# Patient Record
Sex: MALE | Race: BLACK OR AFRICAN AMERICAN | Employment: FULL TIME | URBAN - METROPOLITAN AREA
[De-identification: names, ages, dates, MRNs, and addresses within clinical notes are randomized per-mention and may not be internally consistent; named-entity substitution may affect disease eponyms.]

---

## 2018-07-05 ENCOUNTER — HOSPITAL ENCOUNTER (INPATIENT)
Facility: HOSPITAL | Age: 48
LOS: 1 days | Discharge: HOME | DRG: 305 | End: 2018-07-06
Attending: EMERGENCY MEDICINE | Admitting: HOSPITALIST
Payer: COMMERCIAL

## 2018-07-05 ENCOUNTER — APPOINTMENT (OUTPATIENT)
Dept: GENERAL RADIOLOGY | Facility: HOSPITAL | Age: 48
DRG: 305 | End: 2018-07-05
Attending: EMERGENCY MEDICINE
Payer: COMMERCIAL

## 2018-07-05 DIAGNOSIS — R07.9 ACUTE CHEST PAIN: Primary | ICD-10-CM

## 2018-07-05 LAB
ALBUMIN SERPL-MCNC: 3.3 G/DL (ref 3.5–4.8)
ALP LIVER SERPL-CCNC: 90 U/L (ref 45–117)
ALT SERPL-CCNC: 17 U/L (ref 17–63)
APTT PPP: 30.4 SECONDS (ref 26.1–34.6)
AST SERPL-CCNC: 24 U/L (ref 15–41)
BASOPHILS # BLD AUTO: 0.03 X10(3) UL (ref 0–0.1)
BASOPHILS NFR BLD AUTO: 0.5 %
BILIRUB SERPL-MCNC: 0.3 MG/DL (ref 0.1–2)
BUN BLD-MCNC: 6 MG/DL (ref 8–20)
CALCIUM BLD-MCNC: 8.8 MG/DL (ref 8.3–10.3)
CHLORIDE: 102 MMOL/L (ref 101–111)
CO2: 28 MMOL/L (ref 22–32)
CREAT BLD-MCNC: 1.26 MG/DL (ref 0.7–1.3)
CREAT BLD-MCNC: 1.26 MG/DL (ref 0.7–1.3)
EOSINOPHIL # BLD AUTO: 0.16 X10(3) UL (ref 0–0.3)
EOSINOPHIL NFR BLD AUTO: 2.8 %
ERYTHROCYTE [DISTWIDTH] IN BLOOD BY AUTOMATED COUNT: 13.7 % (ref 11.5–16)
GLUCOSE BLD-MCNC: 102 MG/DL (ref 70–99)
HCT VFR BLD AUTO: 45.2 % (ref 37–53)
HGB BLD-MCNC: 15.4 G/DL (ref 13–17)
IMMATURE GRANULOCYTE COUNT: 0.02 X10(3) UL (ref 0–1)
IMMATURE GRANULOCYTE RATIO %: 0.3 %
INR BLD: 1.19 (ref 0.9–1.1)
LYMPHOCYTES # BLD AUTO: 1.95 X10(3) UL (ref 0.9–4)
LYMPHOCYTES NFR BLD AUTO: 33.9 %
M PROTEIN MFR SERPL ELPH: 7.8 G/DL (ref 6.1–8.3)
MCH RBC QN AUTO: 26.8 PG (ref 27–33.2)
MCHC RBC AUTO-ENTMCNC: 34.1 G/DL (ref 31–37)
MCV RBC AUTO: 78.6 FL (ref 80–99)
MONOCYTES # BLD AUTO: 0.37 X10(3) UL (ref 0.1–1)
MONOCYTES NFR BLD AUTO: 6.4 %
NEUTROPHIL ABS PRELIM: 3.22 X10 (3) UL (ref 1.3–6.7)
NEUTROPHILS # BLD AUTO: 3.22 X10(3) UL (ref 1.3–6.7)
NEUTROPHILS NFR BLD AUTO: 56.1 %
PLATELET # BLD AUTO: 232 10(3)UL (ref 150–450)
POTASSIUM SERPL-SCNC: 3.3 MMOL/L (ref 3.6–5.1)
PSA SERPL DL<=0.01 NG/ML-MCNC: 15.6 SECONDS (ref 12.4–14.7)
RBC # BLD AUTO: 5.75 X10(6)UL (ref 4.3–5.7)
RED CELL DISTRIBUTION WIDTH-SD: 38.7 FL (ref 35.1–46.3)
SODIUM SERPL-SCNC: 140 MMOL/L (ref 136–144)
TROPONIN: <0.046 NG/ML (ref ?–0.05)
TROPONIN: <0.046 NG/ML (ref ?–0.05)
WBC # BLD AUTO: 5.8 X10(3) UL (ref 4–13)

## 2018-07-05 PROCEDURE — 99222 1ST HOSP IP/OBS MODERATE 55: CPT | Performed by: HOSPITALIST

## 2018-07-05 PROCEDURE — 71045 X-RAY EXAM CHEST 1 VIEW: CPT | Performed by: EMERGENCY MEDICINE

## 2018-07-05 RX ORDER — ONDANSETRON 2 MG/ML
4 INJECTION INTRAMUSCULAR; INTRAVENOUS EVERY 4 HOURS PRN
Status: DISCONTINUED | OUTPATIENT
Start: 2018-07-05 | End: 2018-07-06

## 2018-07-05 RX ORDER — NITROGLYCERIN 20 MG/100ML
INJECTION INTRAVENOUS CONTINUOUS
Status: DISCONTINUED | OUTPATIENT
Start: 2018-07-05 | End: 2018-07-06

## 2018-07-05 RX ORDER — NITROGLYCERIN 0.4 MG/1
0.4 TABLET SUBLINGUAL EVERY 5 MIN PRN
Status: DISCONTINUED | OUTPATIENT
Start: 2018-07-05 | End: 2018-07-06

## 2018-07-05 RX ORDER — ACETAMINOPHEN 325 MG/1
650 TABLET ORAL EVERY 6 HOURS PRN
Status: DISCONTINUED | OUTPATIENT
Start: 2018-07-05 | End: 2018-07-06

## 2018-07-05 RX ORDER — CETIRIZINE HYDROCHLORIDE, PSEUDOEPHEDRINE HYDROCHLORIDE 5; 120 MG/1; MG/1
1 TABLET, FILM COATED, EXTENDED RELEASE ORAL 2 TIMES DAILY PRN
COMMUNITY

## 2018-07-05 RX ORDER — ENOXAPARIN SODIUM 100 MG/ML
40 INJECTION SUBCUTANEOUS DAILY
Status: DISCONTINUED | OUTPATIENT
Start: 2018-07-05 | End: 2018-07-06

## 2018-07-05 RX ORDER — ASPIRIN 81 MG/1
TABLET, CHEWABLE ORAL DAILY
COMMUNITY
End: 2018-07-05

## 2018-07-05 RX ORDER — NITROGLYCERIN 0.4 MG/1
0.4 TABLET SUBLINGUAL ONCE
Status: COMPLETED | OUTPATIENT
Start: 2018-07-05 | End: 2018-07-05

## 2018-07-05 RX ORDER — ASPIRIN 81 MG/1
324 TABLET, CHEWABLE ORAL ONCE
Status: DISCONTINUED | OUTPATIENT
Start: 2018-07-05 | End: 2018-07-06

## 2018-07-05 RX ORDER — AMLODIPINE BESYLATE 5 MG/1
5 TABLET ORAL DAILY
Status: DISCONTINUED | OUTPATIENT
Start: 2018-07-05 | End: 2018-07-06

## 2018-07-05 RX ORDER — LISINOPRIL 10 MG/1
10 TABLET ORAL DAILY
Status: DISCONTINUED | OUTPATIENT
Start: 2018-07-05 | End: 2018-07-06

## 2018-07-05 RX ORDER — ASPIRIN 325 MG
325 TABLET ORAL DAILY
Status: DISCONTINUED | OUTPATIENT
Start: 2018-07-05 | End: 2018-07-06

## 2018-07-06 ENCOUNTER — APPOINTMENT (OUTPATIENT)
Dept: CV DIAGNOSTICS | Facility: HOSPITAL | Age: 48
DRG: 305 | End: 2018-07-06
Attending: INTERNAL MEDICINE
Payer: COMMERCIAL

## 2018-07-06 ENCOUNTER — APPOINTMENT (OUTPATIENT)
Dept: CV DIAGNOSTICS | Facility: HOSPITAL | Age: 48
DRG: 305 | End: 2018-07-06
Attending: CLINICAL NURSE SPECIALIST
Payer: COMMERCIAL

## 2018-07-06 VITALS
RESPIRATION RATE: 19 BRPM | WEIGHT: 265.88 LBS | SYSTOLIC BLOOD PRESSURE: 180 MMHG | HEART RATE: 88 BPM | TEMPERATURE: 99 F | OXYGEN SATURATION: 98 % | BODY MASS INDEX: 38.06 KG/M2 | DIASTOLIC BLOOD PRESSURE: 107 MMHG | HEIGHT: 70 IN

## 2018-07-06 LAB
ATRIAL RATE: 94 BPM
ATRIAL RATE: 98 BPM
CHOLEST SMN-MCNC: 225 MG/DL (ref ?–200)
HDLC SERPL-MCNC: 41 MG/DL (ref 45–?)
HDLC SERPL: 5.49 {RATIO} (ref ?–4.97)
LDLC SERPL CALC-MCNC: 146 MG/DL (ref ?–130)
NONHDLC SERPL-MCNC: 184 MG/DL (ref ?–130)
P AXIS: 52 DEGREES
P AXIS: 63 DEGREES
P-R INTERVAL: 154 MS
P-R INTERVAL: 162 MS
POTASSIUM SERPL-SCNC: 3.2 MMOL/L (ref 3.6–5.1)
Q-T INTERVAL: 354 MS
Q-T INTERVAL: 374 MS
QRS DURATION: 76 MS
QRS DURATION: 92 MS
QTC CALCULATION (BEZET): 451 MS
QTC CALCULATION (BEZET): 467 MS
R AXIS: 41 DEGREES
R AXIS: 69 DEGREES
T AXIS: 2 DEGREES
T AXIS: 5 DEGREES
TRIGL SERPL-MCNC: 189 MG/DL (ref ?–150)
TROPONIN: <0.046 NG/ML (ref ?–0.05)
VENTRICULAR RATE: 94 BPM
VENTRICULAR RATE: 98 BPM
VLDLC SERPL CALC-MCNC: 38 MG/DL (ref 5–40)

## 2018-07-06 PROCEDURE — 78452 HT MUSCLE IMAGE SPECT MULT: CPT | Performed by: INTERNAL MEDICINE

## 2018-07-06 PROCEDURE — 93306 TTE W/DOPPLER COMPLETE: CPT | Performed by: CLINICAL NURSE SPECIALIST

## 2018-07-06 PROCEDURE — 93017 CV STRESS TEST TRACING ONLY: CPT | Performed by: INTERNAL MEDICINE

## 2018-07-06 PROCEDURE — 99239 HOSP IP/OBS DSCHRG MGMT >30: CPT | Performed by: INTERNAL MEDICINE

## 2018-07-06 PROCEDURE — 93018 CV STRESS TEST I&R ONLY: CPT | Performed by: INTERNAL MEDICINE

## 2018-07-06 RX ORDER — HYDRALAZINE HYDROCHLORIDE 20 MG/ML
INJECTION INTRAMUSCULAR; INTRAVENOUS
Status: COMPLETED
Start: 2018-07-06 | End: 2018-07-06

## 2018-07-06 RX ORDER — ATORVASTATIN CALCIUM 40 MG/1
40 TABLET, FILM COATED ORAL NIGHTLY
Qty: 30 TABLET | Refills: 6 | Status: SHIPPED | OUTPATIENT
Start: 2018-07-06 | End: 2019-04-30 | Stop reason: ALTCHOICE

## 2018-07-06 RX ORDER — HYDRALAZINE HYDROCHLORIDE 20 MG/ML
10 INJECTION INTRAMUSCULAR; INTRAVENOUS EVERY 4 HOURS PRN
Status: DISCONTINUED | OUTPATIENT
Start: 2018-07-06 | End: 2018-07-06

## 2018-07-06 RX ORDER — NITROGLYCERIN 20 MG/100ML
INJECTION INTRAVENOUS
Status: COMPLETED
Start: 2018-07-06 | End: 2018-07-06

## 2018-07-06 RX ORDER — CARVEDILOL 12.5 MG/1
12.5 TABLET ORAL 2 TIMES DAILY WITH MEALS
Qty: 60 TABLET | Refills: 6 | Status: ON HOLD | OUTPATIENT
Start: 2018-07-06 | End: 2021-12-12

## 2018-07-06 RX ORDER — ATORVASTATIN CALCIUM 20 MG/1
20 TABLET, FILM COATED ORAL NIGHTLY
Status: DISCONTINUED | OUTPATIENT
Start: 2018-07-06 | End: 2018-07-06

## 2018-07-06 RX ORDER — POTASSIUM CHLORIDE 20 MEQ/1
40 TABLET, EXTENDED RELEASE ORAL EVERY 4 HOURS
Status: COMPLETED | OUTPATIENT
Start: 2018-07-06 | End: 2018-07-06

## 2018-07-06 RX ORDER — NITROGLYCERIN 20 MG/100ML
INJECTION INTRAVENOUS CONTINUOUS
Status: DISCONTINUED | OUTPATIENT
Start: 2018-07-06 | End: 2018-07-06

## 2018-07-06 RX ORDER — ATORVASTATIN CALCIUM 20 MG/1
20 TABLET, FILM COATED ORAL NIGHTLY
Qty: 30 TABLET | Refills: 0 | Status: SHIPPED | OUTPATIENT
Start: 2018-07-06 | End: 2018-07-06

## 2018-07-06 RX ORDER — ATORVASTATIN CALCIUM 40 MG/1
40 TABLET, FILM COATED ORAL NIGHTLY
Status: DISCONTINUED | OUTPATIENT
Start: 2018-07-06 | End: 2018-07-06

## 2018-07-06 RX ORDER — AMLODIPINE BESYLATE 5 MG/1
5 TABLET ORAL DAILY
Qty: 30 TABLET | Refills: 6 | Status: SHIPPED | OUTPATIENT
Start: 2018-07-07

## 2018-07-06 RX ORDER — CARVEDILOL 12.5 MG/1
12.5 TABLET ORAL 2 TIMES DAILY WITH MEALS
Status: DISCONTINUED | OUTPATIENT
Start: 2018-07-06 | End: 2018-07-06

## 2018-07-06 NOTE — PAYOR COMM NOTE
--------------  ADMISSION REVIEW     Payor: [address] [place] [address]  Subscriber #:  [id number]  Authorization Number: N/A    Admit date: 7/5/18  Admit time: [address]       Admitting Physician: Deidre Hinkle MD  Attending Physician:  Dennis Garcia MD  Primary BP: (!) 192/116  Pulse: 92  Resp: 16  Temp: 98.7 °F (37.1 °C)  Temp src: Temporal  SpO2: 100 %  O2 Device: None (Room air)    Current:BP (!) 171/117   Pulse 102   Temp 98.7 °F (37.1 °C) (Temporal)   Resp 16   Ht 177.8 cm (5' 10\")   Wt 117.9 kg   SpO2 99% PROCEDURE:  XR CHEST AP PORTABLE  (CPT=71045)  TECHNIQUE:  AP chest radiograph was obtained. COMPARISON:  None. INDICATIONS:  chest pain  PATIENT STATED HISTORY: (As transcribed by Technologist)  Patient complains of mid chest pain starting today.     FIN I reviewed that chart and discussed the case. I have examined the patient and noted patient has extremely elevated blood pressure. He has a history of exertional chest pain with a strong family history.   Sublingual nitros were given which brought his antonio No current outpatient prescriptions on file prior to encounter. Review of Systems:   A comprehensive 14 point review of systems was completed. Pertinent positives and negatives noted in the HPI.     Physical Exam:    BP (!) 171/117   Pulse 102   Temp [facility]  Report of Consultation     Reason for Consultation:  Chest pain     Assessment/Plan:  1. Chest pain- troponin is normal X 2. ECG unremarkable for acute ischemic changes. Likely secondary to HTN  2.  HTN urgency  · Treat BP   · Date Action Dose Route User    7/6/2018 0220 Given 10 mg (none) José Scales, RN      lisinopril (PRINIVIL,ZESTRIL) tab 10 mg     Date Action Dose Route User    7/5/2018 2351 Given 10 mg Oral Hayes Melton, RN      nitroGLYCERIN (NITROSTAT) SL tab

## 2018-07-06 NOTE — ED PROVIDER NOTES
Patient Seen in: [facility] Emergency Department    History   Patient presents with:  Chest Pain Angina (cardiovascular)    Stated Complaint: chest pain    HPI    Patient is a 79-year-old male. Patient has a medical history of hypertension.   Sandy °C) (Temporal)   Resp 16   Ht 177.8 cm (5' 10\")   Wt 117.9 kg   SpO2 99%   BMI 37.31 kg/m²         Physical Exam    Gen: Well appearing, well groomed, alert and aware x 3  Neck: Supple, full range of motion, no thyromegaly or lymphadenopathy.   Eye examina Sinus Rhythm  Reading: Normal sinus rhythm with occasional PVCs.   No evidence of acute ischemic change           ED Course as of Jul 05 2124  ------------------------------------------------------------  Xr Chest Ap Portable  (cpt=71045)    Result Date: 7/ Discharge Medication List        Present on Admission  Date Reviewed: 7/5/2018          ICD-10-CM Noted POA    Chest pain R07.9 7/5/2018 Unknown

## 2018-07-06 NOTE — PLAN OF CARE
NURSING DISCHARGE NOTE    Discharged Home via Ambulatory. Accompanied by Family member and RN  Belongings Taken by patient/family . Denies chest pain. Stress test negative. BP 180s/100s this evening. Started coreg. RN discussed BP with ROSSY CARRANZA.

## 2018-07-06 NOTE — PROGRESS NOTES
[facility]  Progress Note      Patient Status:  Inpatient    1970 MRN [id number]   [facility] 6NE-A Attending Nick Blankenship MD   Hosp Day # 1 PCP None Pcp     Chief Complaint: mild headache     S: Patient with no 5 mg Oral Daily       ASSESSMENT / PLAN:     1. Chest pain   1. Resolved   2. Plan for Echo and stress test today per cardiology   2. Accelerated HTN   1. Cont Amlodipine and lisinopril   2.  PCP to be set up for pt as he is new to the area   3. hyperlipide

## 2018-07-06 NOTE — H&P
[facility]  History and Physical     Destiny Gant Patient Status:  Emergency    1970 MRN [id number]   Location [address] [facility] Attending Claudene Corp, MD   Hosp Day # 0 PCP None Pcp     Chief Complaint: Chest p Normocephalic atraumatic. Moist mucous membranes. EOM-I. PERRLA. Anicteric. Neck: No lymphadenopathy. No JVD. No carotid bruits. Respiratory: Clear to auscultation bilaterally. No wheezes. No rhonchi. Cardiovascular: S1, S2. Regular rate and rhythm.  No

## 2018-07-06 NOTE — PLAN OF CARE
Resting in bed. Pt encourage to ambulate TID. Denies chest pain, SOB, dizziness/lightheadedness. States does has \"stress\" from job. SBP improved but DBP remains elevated. Nitro gtt infusing. Oral BP medication given. Eating well.  Awaiting physician

## 2018-07-06 NOTE — CONSULTS
[facility]  Report of Consultation    Ousmane Pierre Patient Status:  Inpatient    1970 MRN [id number]   [facility] 6NE-A Attending Shweta Humphries MD   Hosp Day # 0 PCP None Pcp     Reason for [facility] from Last 3 Encounters:  07/05/18 : 260 lb      Telemetry: SR  General: Alert and oriented in no apparent distress. HEENT: No focal deficits. Neck: No JVD, carotids 2+ no bruits. Cardiac: Regular rate and rhythm, S1, S2 normal, no murmur, rub or gallop.

## 2018-07-06 NOTE — PROGRESS NOTES
Seen and examined in nuclear medicine suite. Resting comfortably. Feels ok. No chest pain and admitting pressure by history not s/o angina. Troponins negative.   ECG: NSR with NSSTTW changes    K+ 3.3 and 3.2        Echo with hyperdynamic LV funct

## 2018-07-06 NOTE — PROGRESS NOTES
CARDIODIAGNOSTIC PRELIMINARY REPORT:      LEXISCAN injection completed with no new EKG changes noted; PVC's     Denied cardiac symptoms     Base:  152/106, HR: 86;  Peak: 168/110, HR: 100    Second set of images pending    Addendum: no ischemia, LVEF 57%.

## 2018-07-07 NOTE — DISCHARGE SUMMARY
[facility]ITAL[facility]  DISCHARGE SUMMARY     Tarun Box Patient Status:  Inpatient    1970 MRN [id number]   [facility] 6NE-A Attending No att. providers found   Hosp Day # 1 PCP None Pcp     Date of Admission: 2018  Date of [address]  Initial ischemic workup is negative    Brief Synopsis:   Patient presented with uncontrolled hypertension and complaints of chest pain. Patient was admitted to the CCU started on nitroglycerin drip. Patient had negative troponins.   Blood pressure was [facility] primary care    Lab/Test results pending at Discharge:   · None    Consultants:  Cardiology  Discharge Medication List:     Discharge Medications      START taking these medications      Instructions Prescription details   AmLODIPine Besylate 5 MG Tabs  Co wheezes. No rhonchi. Cardiovascular: S1, S2. Regular rate and rhythm. No murmurs, rubs or gallops. Abdomen: Soft, nontender, nondistended. Positive bowel sounds. No rebound or guarding. Neurologic: No focal neurological deficits.    Musculoskeletal: Mo

## 2018-07-09 NOTE — PAYOR COMM NOTE
--------------  DISCHARGE REVIEW    Payor: [facility]  Subscriber #:  [id number]  Authorization Number: [id number]    Admit date: 7/5/18  Admit time:  [address]  Discharge Date: 7/6/2018  6:13 PM     Admitting Physician: Torie Chiu MD    Primary

## 2019-03-25 ENCOUNTER — HOSPITAL ENCOUNTER (EMERGENCY)
Facility: CLINIC | Age: 49
Discharge: HOME | End: 2019-03-26
Attending: EMERGENCY MEDICINE | Admitting: EMERGENCY MEDICINE
Payer: COMMERCIAL

## 2019-03-25 ENCOUNTER — APPOINTMENT (OUTPATIENT)
Dept: GENERAL RADIOLOGY | Facility: CLINIC | Age: 49
End: 2019-03-25
Attending: EMERGENCY MEDICINE
Payer: COMMERCIAL

## 2019-03-25 DIAGNOSIS — R07.9 CHEST PAIN, UNSPECIFIED TYPE: ICD-10-CM

## 2019-03-25 PROCEDURE — 99285 EMERGENCY DEPT VISIT HI MDM: CPT

## 2019-03-25 PROCEDURE — 93005 ELECTROCARDIOGRAM TRACING: CPT

## 2019-03-25 PROCEDURE — 71046 X-RAY EXAM CHEST 2 VIEWS: CPT

## 2019-03-25 PROCEDURE — 84484 ASSAY OF TROPONIN QUANT: CPT | Performed by: EMERGENCY MEDICINE

## 2019-03-25 ASSESSMENT — ENCOUNTER SYMPTOMS: NUMBNESS: 0

## 2019-03-25 NOTE — ED AVS SNAPSHOT
Emergency Department  [address] [facility] [address]  Phone:  [phone]  Fax:  [phone]                                    Nitesh Rossi   MRN: [id number]    Department:   Emergency Department   Date of Visit:  3/25/2019           After Visit Summary Signature Page    I have received my discharge instructions, and my questions have been answered. I have discussed any challenges I see with this plan with the nurse or doctor.    ..........................................................................................................................................  Patient/Patient Representative Signature      ..........................................................................................................................................  Patient Representative Print Name and Relationship to Patient    ..................................................               ................................................  Date                                   Time    ..........................................................................................................................................  Reviewed by Signature/Title    ...................................................              ..............................................  Date                                               Time          22EPIC Rev 08/18

## 2019-03-26 VITALS
TEMPERATURE: 97.8 F | WEIGHT: 270.6 LBS | HEART RATE: 82 BPM | OXYGEN SATURATION: 97 % | RESPIRATION RATE: 16 BRPM | DIASTOLIC BLOOD PRESSURE: 96 MMHG | SYSTOLIC BLOOD PRESSURE: 137 MMHG

## 2019-03-26 LAB
INTERPRETATION ECG - MUSE: NORMAL
TROPONIN I SERPL-MCNC: <0.015 UG/L (ref 0–0.04)

## 2019-03-26 NOTE — ED PROVIDER NOTES
"  History     Chief Complaint:    Chest Pain       HPI   Nitesh Rossi is a 48 year old male with a history of hypertension who presents to the ED for evaluation of chest pain. The patient states that he developed left sided chest pain yesterday that he initially attributed to a muscle strain after sitting awkwardly on a flight from [place] to [place]. His chest pain resolved overnight last night. Tonight around 2100, however, his sharp left sided chest pain returned while he was laying down to go to bed. He states that his pain now radiates to the right side of his chest as well. He reports some exacerbation of his chest pain when he moves his arms. He also complains of feeling \"fuzzy or flushed.\" He denies any numbness. He notes that he has had some recent sick exposures to his family members who have had a \"stomach flu.\"    Allergies:  The patient has no known drug allergies.    Medications:    Spironolactone  Edarbyclor  Amlodipine    Past Medical History:    HTN    Past Surgical History:    The patient does not have any pertinent past surgical history.    Family History:    HTN    Social History:  Negative for tobacco use.  From [place], in [place] for work.   Marital Status:         Review of Systems   Cardiovascular: Positive for chest pain.   Neurological: Negative for numbness.   All other systems reviewed and are negative.        Physical Exam   First Vitals:  BP: (!) 153/91  Pulse: 87  Temp: 97.8  F (36.6  C)  Resp: 16  Weight: 122.7 kg (270 lb 9.6 oz)  SpO2: 100 %      Physical Exam  Constitutional: The patient is oriented to person, place, and time.   HENT:   Head: Atraumatic  Right Ear: Normal  Left Ear: Normal  Nose: Nose normal.   Mouth/Throat: Oropharynx is clear and moist. No erythema or exudate.   Eyes: Conjunctivae and EOM are normal. Pupils are equal, round, and reactive to light. No discharge  Neck: Normal range of motion. Neck supple.   Cardiovascular: Normal rate, regular " rhythm, no murmur gallops or rubs. Intact distal pulses.    Pulmonary/Chest: CTA bilaterally. No wheezes rale or rhonchi.  Abdominal: Soft. Non tender.  No masses   Musculoskeletal: No edema. No bony deformity. Normal range of motion  Lymphadenopathy:     The patient has no cervical adenopathy.   Neurological: The patient is alert and oriented to person, place, and time. The patient has normal strength and normal reflexes. No cranial nerve deficit. Coordination normal.   Skin: Skin is warm and dry. No rash noted. The patient is not diaphoretic.   Psychiatric: The patient has a normal mood and affect.    Emergency Department Course   ECG:  Indication: chest pain  Time: 2339  Vent. Rate 85 bpm. MA interval 182. QRS duration 86. QT/QTc 412/490. P-R-T axis 50 53 22.  Normal sinus rhythm. Possible left atrial enlargement. Nonspecific T wave abnormality. Prolonged QT. Read time: 2344    Imaging:  Radiographic findings were communicated with the patient who voiced understanding of the findings.  XR Chest 2 views:   No radiographic evidence of acute chest abnormality as per radiology.    Laboratory:  2348 Troponin: <0.015    Emergency Department Course:  Nursing notes and vitals reviewed. (2334) I performed an exam of the patient as documented above.     IV inserted. Blood drawn. This was sent to the lab for further testing, results above.     The patient was sent for a chest XR while in the emergency department, findings above.     0016 I rechecked the patient and discussed the results of his workup thus far.     Findings and plan explained to the Patient. Patient discharged home with instructions regarding supportive care, medications, and reasons to return. The importance of close follow-up was reviewed.     I personally reviewed the laboratory results with the Patient and answered all related questions prior to discharge.   Impression & Plan    Medical Decision Making:  Nitesh Rossi is a 48 year old male who presents  with left anterior chest pain made worse with left arm movement. Based on history and exam, suspect this is likely musculoskeletal in nature. His EKG shows some nonspecific T wave changes but no evidence of acute ischemia or infarct. Troponin is negative and chest XR is unremarkable. At this point I feel the patient can be safely discharged home. I do note that in discussion with the patient that he has had chest pain in the past and has had a recent stress test. I do not feel this needs to be rescheduled at this time. He should follow up with his PCP when he gets home to [place].       Diagnosis:    ICD-10-CM    1. Chest pain, unspecified type R07.9        Disposition:  discharged to home      Scribe Disclosure:  I,  Ahmet Wellington, am serving as a scribe on 3/25/2019 at 11:34 PM to personally document services performed by Colin Alba MD based on my observations and the provider's statements to me.          Ahmet Wellington  3/25/2019    EMERGENCY DEPARTMENT       Colin Alba MD  03/26/19 0421

## 2019-04-11 RX ORDER — ATORVASTATIN CALCIUM 40 MG/1
TABLET, FILM COATED ORAL
Qty: 30 TABLET | Refills: 6 | OUTPATIENT
Start: 2019-04-11

## 2019-04-11 RX ORDER — CARVEDILOL 12.5 MG/1
TABLET ORAL
Qty: 60 TABLET | Refills: 6 | OUTPATIENT
Start: 2019-04-11

## 2019-04-30 ENCOUNTER — APPOINTMENT (OUTPATIENT)
Dept: GENERAL RADIOLOGY | Facility: HOSPITAL | Age: 49
End: 2019-04-30
Payer: COMMERCIAL

## 2019-04-30 ENCOUNTER — HOSPITAL ENCOUNTER (EMERGENCY)
Facility: HOSPITAL | Age: 49
Discharge: HOME | End: 2019-04-30
Attending: EMERGENCY MEDICINE
Payer: COMMERCIAL

## 2019-04-30 VITALS
DIASTOLIC BLOOD PRESSURE: 80 MMHG | HEIGHT: 70 IN | RESPIRATION RATE: 22 BRPM | SYSTOLIC BLOOD PRESSURE: 124 MMHG | BODY MASS INDEX: 37.22 KG/M2 | HEART RATE: 70 BPM | TEMPERATURE: 98 F | WEIGHT: 260 LBS | OXYGEN SATURATION: 98 %

## 2019-04-30 DIAGNOSIS — E87.6 HYPOKALEMIA: Primary | ICD-10-CM

## 2019-04-30 DIAGNOSIS — R19.7 DIARRHEA, UNSPECIFIED TYPE: ICD-10-CM

## 2019-04-30 DIAGNOSIS — R07.89 DISCOMFORT IN CHEST: ICD-10-CM

## 2019-04-30 PROCEDURE — 84484 ASSAY OF TROPONIN QUANT: CPT

## 2019-04-30 PROCEDURE — 93010 ELECTROCARDIOGRAM REPORT: CPT

## 2019-04-30 PROCEDURE — 93005 ELECTROCARDIOGRAM TRACING: CPT

## 2019-04-30 PROCEDURE — 85025 COMPLETE CBC W/AUTO DIFF WBC: CPT | Performed by: EMERGENCY MEDICINE

## 2019-04-30 PROCEDURE — 99285 EMERGENCY DEPT VISIT HI MDM: CPT

## 2019-04-30 PROCEDURE — 36415 COLL VENOUS BLD VENIPUNCTURE: CPT

## 2019-04-30 PROCEDURE — 71045 X-RAY EXAM CHEST 1 VIEW: CPT | Performed by: EMERGENCY MEDICINE

## 2019-04-30 PROCEDURE — 80053 COMPREHEN METABOLIC PANEL: CPT | Performed by: EMERGENCY MEDICINE

## 2019-04-30 PROCEDURE — 85025 COMPLETE CBC W/AUTO DIFF WBC: CPT

## 2019-04-30 PROCEDURE — 80053 COMPREHEN METABOLIC PANEL: CPT

## 2019-04-30 PROCEDURE — 84484 ASSAY OF TROPONIN QUANT: CPT | Performed by: EMERGENCY MEDICINE

## 2019-04-30 RX ORDER — RESORCINOL/BALSAM/BISMUTH/ZINC
2 SUPPOSITORY, RECTAL RECTAL
Qty: 60 TABLET | Refills: 0 | Status: SHIPPED | OUTPATIENT
Start: 2019-04-30 | End: 2019-05-01

## 2019-04-30 RX ORDER — KETOCONAZOLE 20 MG/ML
SHAMPOO TOPICAL
COMMUNITY

## 2019-04-30 RX ORDER — POTASSIUM CHLORIDE 20 MEQ/1
40 TABLET, EXTENDED RELEASE ORAL ONCE
Status: COMPLETED | OUTPATIENT
Start: 2019-04-30 | End: 2019-04-30

## 2019-04-30 RX ORDER — ASPIRIN 81 MG/1
324 TABLET, CHEWABLE ORAL ONCE
Status: COMPLETED | OUTPATIENT
Start: 2019-04-30 | End: 2019-04-30

## 2019-04-30 RX ORDER — POTASSIUM CHLORIDE 20 MEQ/1
20 TABLET, EXTENDED RELEASE ORAL 2 TIMES DAILY
Qty: 6 TABLET | Refills: 0 | Status: SHIPPED | OUTPATIENT
Start: 2019-04-30 | End: 2019-05-03

## 2019-04-30 RX ORDER — CHLORTHALIDONE 25 MG/1
25 TABLET ORAL DAILY
COMMUNITY

## 2019-04-30 RX ORDER — NEBIVOLOL 5 MG/1
5 TABLET ORAL DAILY
COMMUNITY

## 2019-04-30 NOTE — ED PROVIDER NOTES
Patient Seen in: [facility] Emergency Department    History   Patient presents with:  Chest Pain Angina (cardiovascular)    Stated Complaint: chest pain    HPI    50-year-old male presents to the emergency department with an odd feeling in his upper exam is within normal limits and his neck is supple his lungs are clear to auscultation his heart has regular rate and rhythm without murmurs rubs or gallops his abdomen is mildly obese soft nontender nondistended upper and lower extremities are benign.   Ralph Morelos transcribed by Technologist)  He has sternal     chest pain that started this morning. He states he does not have any     cardiac history. FINDINGS:  Normal heart size and pulmonary vascularity. No pleural     effusion or pneumothorax.  No lo tablet, R-0

## 2019-05-23 ENCOUNTER — HOSPITAL ENCOUNTER (EMERGENCY)
Facility: HOSPITAL | Age: 49
Discharge: HOME | End: 2019-05-23
Attending: EMERGENCY MEDICINE
Payer: COMMERCIAL

## 2019-05-23 ENCOUNTER — APPOINTMENT (OUTPATIENT)
Dept: CV DIAGNOSTICS | Facility: HOSPITAL | Age: 49
End: 2019-05-23
Attending: PHYSICIAN ASSISTANT
Payer: COMMERCIAL

## 2019-05-23 ENCOUNTER — APPOINTMENT (OUTPATIENT)
Dept: GENERAL RADIOLOGY | Facility: HOSPITAL | Age: 49
End: 2019-05-23
Payer: COMMERCIAL

## 2019-05-23 VITALS
TEMPERATURE: 99 F | HEIGHT: 69 IN | BODY MASS INDEX: 38.51 KG/M2 | SYSTOLIC BLOOD PRESSURE: 153 MMHG | OXYGEN SATURATION: 97 % | HEART RATE: 74 BPM | DIASTOLIC BLOOD PRESSURE: 82 MMHG | RESPIRATION RATE: 16 BRPM | WEIGHT: 260 LBS

## 2019-05-23 DIAGNOSIS — R07.89 CHEST PAIN, NON-CARDIAC: Primary | ICD-10-CM

## 2019-05-23 PROCEDURE — 93017 CV STRESS TEST TRACING ONLY: CPT | Performed by: PHYSICIAN ASSISTANT

## 2019-05-23 PROCEDURE — 93018 CV STRESS TEST I&R ONLY: CPT | Performed by: PHYSICIAN ASSISTANT

## 2019-05-23 PROCEDURE — 93005 ELECTROCARDIOGRAM TRACING: CPT

## 2019-05-23 PROCEDURE — 84484 ASSAY OF TROPONIN QUANT: CPT

## 2019-05-23 PROCEDURE — 84484 ASSAY OF TROPONIN QUANT: CPT | Performed by: PHYSICIAN ASSISTANT

## 2019-05-23 PROCEDURE — 80053 COMPREHEN METABOLIC PANEL: CPT | Performed by: EMERGENCY MEDICINE

## 2019-05-23 PROCEDURE — 93010 ELECTROCARDIOGRAM REPORT: CPT

## 2019-05-23 PROCEDURE — 36415 COLL VENOUS BLD VENIPUNCTURE: CPT

## 2019-05-23 PROCEDURE — 93350 STRESS TTE ONLY: CPT | Performed by: PHYSICIAN ASSISTANT

## 2019-05-23 PROCEDURE — 71045 X-RAY EXAM CHEST 1 VIEW: CPT | Performed by: EMERGENCY MEDICINE

## 2019-05-23 PROCEDURE — 85025 COMPLETE CBC W/AUTO DIFF WBC: CPT | Performed by: EMERGENCY MEDICINE

## 2019-05-23 PROCEDURE — 84484 ASSAY OF TROPONIN QUANT: CPT | Performed by: EMERGENCY MEDICINE

## 2019-05-23 PROCEDURE — 99285 EMERGENCY DEPT VISIT HI MDM: CPT

## 2019-05-23 PROCEDURE — 80053 COMPREHEN METABOLIC PANEL: CPT

## 2019-05-23 PROCEDURE — 85025 COMPLETE CBC W/AUTO DIFF WBC: CPT

## 2019-05-23 RX ORDER — ASPIRIN 81 MG/1
324 TABLET, CHEWABLE ORAL ONCE
Status: COMPLETED | OUTPATIENT
Start: 2019-05-23 | End: 2019-05-23

## 2019-05-23 NOTE — ED INITIAL ASSESSMENT (HPI)
Patient to ED c/o chest pain that started around 1220 today. Patient to mid / left chest and going down left arm. Denies SOB or difficulty breathing.

## 2019-05-23 NOTE — ED PROVIDER NOTES
Patient Seen in: [facility] Emergency Department    History   Patient presents with:  Chest Pain Angina (cardiovascular)    Stated Complaint: chest pain    JB Canales is a 52-year-old male who presents today for evaluation of left-sided chest pa Resp 16   Ht 175.3 cm (5' 9\")   Wt 117.9 kg   SpO2 97%   BMI 38.40 kg/m²         Physical Exam   Constitutional: He is oriented to person, place, and time. He appears well-developed and well-nourished. No distress.    HENT:   Head: Normocephalic and atrau 75bpm  Rhythm: Sinus Rhythm  Reading: Normal sinus rhythm. Possible inferior infarct, age undetermined. Abnormal ECG. The EKG shows NSR. There are no acute ST elevations. The rest of the EKG including axis and intervals are within normal limits.  I ag Disposition and Plan     Clinical Impression:  Chest pain, non-cardiac  (primary encounter diagnosis)    Disposition:  Discharge  5/23/2019  5:30 pm    Follow-up:  Hema Ansari MD  [address]

## 2019-05-23 NOTE — PROGRESS NOTES
Preliminary Cardiac Diagnostic Note:    No EKG changes noted after pt walked 6:02 on Osmar protocol for stress echo  Pt denied cardiac symptoms  Few to occ pvcs noted, few ventricular couplets noted  Echo pending.     Dr Grace Lopez notified of completion of te

## 2020-01-05 ENCOUNTER — HOSPITAL ENCOUNTER (EMERGENCY)
Facility: HOSPITAL | Age: 50
Discharge: HOME | End: 2020-01-06
Attending: EMERGENCY MEDICINE
Payer: COMMERCIAL

## 2020-01-05 ENCOUNTER — APPOINTMENT (OUTPATIENT)
Dept: GENERAL RADIOLOGY | Facility: HOSPITAL | Age: 50
End: 2020-01-05
Payer: COMMERCIAL

## 2020-01-05 DIAGNOSIS — R07.89 CHEST PAIN, ATYPICAL: Primary | ICD-10-CM

## 2020-01-05 LAB
ALBUMIN SERPL-MCNC: 3.5 G/DL (ref 3.4–5)
ALBUMIN/GLOB SERPL: 0.8 {RATIO} (ref 1–2)
ALP LIVER SERPL-CCNC: 89 U/L (ref 45–117)
ALT SERPL-CCNC: 15 U/L (ref 16–61)
ANION GAP SERPL CALC-SCNC: 5 MMOL/L (ref 0–18)
AST SERPL-CCNC: 23 U/L (ref 15–37)
BASOPHILS # BLD AUTO: 0.03 X10(3) UL (ref 0–0.2)
BASOPHILS NFR BLD AUTO: 0.5 %
BILIRUB SERPL-MCNC: 0.4 MG/DL (ref 0.1–2)
BUN BLD-MCNC: 12 MG/DL (ref 7–18)
BUN/CREAT SERPL: 9 (ref 10–20)
CALCIUM BLD-MCNC: 9.2 MG/DL (ref 8.5–10.1)
CHLORIDE SERPL-SCNC: 103 MMOL/L (ref 98–112)
CO2 SERPL-SCNC: 29 MMOL/L (ref 21–32)
CREAT BLD-MCNC: 1.34 MG/DL (ref 0.7–1.3)
DEPRECATED RDW RBC AUTO: 37.5 FL (ref 35.1–46.3)
EOSINOPHIL # BLD AUTO: 0.1 X10(3) UL (ref 0–0.7)
EOSINOPHIL NFR BLD AUTO: 1.5 %
ERYTHROCYTE [DISTWIDTH] IN BLOOD BY AUTOMATED COUNT: 12.8 % (ref 11–15)
GLOBULIN PLAS-MCNC: 4.6 G/DL (ref 2.8–4.4)
GLUCOSE BLD-MCNC: 94 MG/DL (ref 70–99)
HCT VFR BLD AUTO: 45.7 % (ref 39–53)
HGB BLD-MCNC: 15.2 G/DL (ref 13–17.5)
IMM GRANULOCYTES # BLD AUTO: 0.01 X10(3) UL (ref 0–1)
IMM GRANULOCYTES NFR BLD: 0.2 %
LYMPHOCYTES # BLD AUTO: 2.57 X10(3) UL (ref 1–4)
LYMPHOCYTES NFR BLD AUTO: 39.4 %
M PROTEIN MFR SERPL ELPH: 8.1 G/DL (ref 6.4–8.2)
MCH RBC QN AUTO: 27 PG (ref 26–34)
MCHC RBC AUTO-ENTMCNC: 33.3 G/DL (ref 31–37)
MCV RBC AUTO: 81.3 FL (ref 80–100)
MONOCYTES # BLD AUTO: 0.46 X10(3) UL (ref 0.1–1)
MONOCYTES NFR BLD AUTO: 7.1 %
NEUTROPHILS # BLD AUTO: 3.35 X10 (3) UL (ref 1.5–7.7)
NEUTROPHILS # BLD AUTO: 3.35 X10(3) UL (ref 1.5–7.7)
NEUTROPHILS NFR BLD AUTO: 51.3 %
OSMOLALITY SERPL CALC.SUM OF ELEC: 284 MOSM/KG (ref 275–295)
PLATELET # BLD AUTO: 269 10(3)UL (ref 150–450)
POTASSIUM SERPL-SCNC: 3 MMOL/L (ref 3.5–5.1)
RBC # BLD AUTO: 5.62 X10(6)UL (ref 4.3–5.7)
SODIUM SERPL-SCNC: 137 MMOL/L (ref 136–145)
TROPONIN I SERPL-MCNC: <0.045 NG/ML (ref ?–0.04)
WBC # BLD AUTO: 6.5 X10(3) UL (ref 4–11)

## 2020-01-05 PROCEDURE — 80053 COMPREHEN METABOLIC PANEL: CPT | Performed by: EMERGENCY MEDICINE

## 2020-01-05 PROCEDURE — 99284 EMERGENCY DEPT VISIT MOD MDM: CPT | Performed by: EMERGENCY MEDICINE

## 2020-01-05 PROCEDURE — 71045 X-RAY EXAM CHEST 1 VIEW: CPT | Performed by: EMERGENCY MEDICINE

## 2020-01-05 PROCEDURE — 93010 ELECTROCARDIOGRAM REPORT: CPT | Performed by: EMERGENCY MEDICINE

## 2020-01-05 PROCEDURE — 36415 COLL VENOUS BLD VENIPUNCTURE: CPT | Performed by: EMERGENCY MEDICINE

## 2020-01-05 PROCEDURE — 85025 COMPLETE CBC W/AUTO DIFF WBC: CPT | Performed by: EMERGENCY MEDICINE

## 2020-01-05 PROCEDURE — 84484 ASSAY OF TROPONIN QUANT: CPT | Performed by: EMERGENCY MEDICINE

## 2020-01-05 PROCEDURE — 83735 ASSAY OF MAGNESIUM: CPT | Performed by: EMERGENCY MEDICINE

## 2020-01-05 PROCEDURE — 93005 ELECTROCARDIOGRAM TRACING: CPT

## 2020-01-06 ENCOUNTER — APPOINTMENT (OUTPATIENT)
Dept: CT IMAGING | Facility: HOSPITAL | Age: 50
End: 2020-01-06
Attending: EMERGENCY MEDICINE
Payer: COMMERCIAL

## 2020-01-06 ENCOUNTER — APPOINTMENT (OUTPATIENT)
Dept: CV DIAGNOSTICS | Facility: HOSPITAL | Age: 50
End: 2020-01-06
Attending: EMERGENCY MEDICINE
Payer: COMMERCIAL

## 2020-01-06 VITALS
HEART RATE: 84 BPM | RESPIRATION RATE: 22 BRPM | TEMPERATURE: 98 F | OXYGEN SATURATION: 96 % | SYSTOLIC BLOOD PRESSURE: 113 MMHG | DIASTOLIC BLOOD PRESSURE: 75 MMHG | BODY MASS INDEX: 36 KG/M2 | WEIGHT: 242.06 LBS

## 2020-01-06 VITALS
OXYGEN SATURATION: 99 % | HEIGHT: 69 IN | BODY MASS INDEX: 35.84 KG/M2 | DIASTOLIC BLOOD PRESSURE: 89 MMHG | TEMPERATURE: 97 F | WEIGHT: 242 LBS | RESPIRATION RATE: 16 BRPM | SYSTOLIC BLOOD PRESSURE: 118 MMHG | HEART RATE: 55 BPM

## 2020-01-06 DIAGNOSIS — S29.012A STRAIN OF THORACIC SPINE: ICD-10-CM

## 2020-01-06 DIAGNOSIS — R07.9 CHEST PAIN OF UNCERTAIN ETIOLOGY: Primary | ICD-10-CM

## 2020-01-06 LAB
ALBUMIN SERPL-MCNC: 3.6 G/DL (ref 3.4–5)
ALBUMIN/GLOB SERPL: 0.8 {RATIO} (ref 1–2)
ALP LIVER SERPL-CCNC: 87 U/L (ref 45–117)
ALT SERPL-CCNC: 16 U/L (ref 16–61)
ANION GAP SERPL CALC-SCNC: 6 MMOL/L (ref 0–18)
AST SERPL-CCNC: 18 U/L (ref 15–37)
ATRIAL RATE: 54 BPM
ATRIAL RATE: 62 BPM
BASOPHILS # BLD AUTO: 0.02 X10(3) UL (ref 0–0.2)
BASOPHILS NFR BLD AUTO: 0.4 %
BILIRUB SERPL-MCNC: 0.6 MG/DL (ref 0.1–2)
BUN BLD-MCNC: 11 MG/DL (ref 7–18)
BUN/CREAT SERPL: 8.7 (ref 10–20)
CALCIUM BLD-MCNC: 9.2 MG/DL (ref 8.5–10.1)
CHLORIDE SERPL-SCNC: 101 MMOL/L (ref 98–112)
CO2 SERPL-SCNC: 31 MMOL/L (ref 21–32)
CREAT BLD-MCNC: 1.27 MG/DL (ref 0.7–1.3)
DEPRECATED RDW RBC AUTO: 38.5 FL (ref 35.1–46.3)
EOSINOPHIL # BLD AUTO: 0.1 X10(3) UL (ref 0–0.7)
EOSINOPHIL NFR BLD AUTO: 2.1 %
ERYTHROCYTE [DISTWIDTH] IN BLOOD BY AUTOMATED COUNT: 12.9 % (ref 11–15)
GLOBULIN PLAS-MCNC: 4.5 G/DL (ref 2.8–4.4)
GLUCOSE BLD-MCNC: 90 MG/DL (ref 70–99)
HAV IGM SER QL: 1.8 MG/DL (ref 1.6–2.6)
HCT VFR BLD AUTO: 46.6 % (ref 39–53)
HGB BLD-MCNC: 15.3 G/DL (ref 13–17.5)
IMM GRANULOCYTES # BLD AUTO: 0 X10(3) UL (ref 0–1)
IMM GRANULOCYTES NFR BLD: 0 %
LYMPHOCYTES # BLD AUTO: 1.95 X10(3) UL (ref 1–4)
LYMPHOCYTES NFR BLD AUTO: 41.4 %
M PROTEIN MFR SERPL ELPH: 8.1 G/DL (ref 6.4–8.2)
MCH RBC QN AUTO: 27 PG (ref 26–34)
MCHC RBC AUTO-ENTMCNC: 32.8 G/DL (ref 31–37)
MCV RBC AUTO: 82.3 FL (ref 80–100)
MONOCYTES # BLD AUTO: 0.41 X10(3) UL (ref 0.1–1)
MONOCYTES NFR BLD AUTO: 8.7 %
NEUTROPHILS # BLD AUTO: 2.23 X10 (3) UL (ref 1.5–7.7)
NEUTROPHILS # BLD AUTO: 2.23 X10(3) UL (ref 1.5–7.7)
NEUTROPHILS NFR BLD AUTO: 47.4 %
OSMOLALITY SERPL CALC.SUM OF ELEC: 285 MOSM/KG (ref 275–295)
P AXIS: 46 DEGREES
P AXIS: 47 DEGREES
P-R INTERVAL: 190 MS
P-R INTERVAL: 194 MS
PLATELET # BLD AUTO: 261 10(3)UL (ref 150–450)
POTASSIUM SERPL-SCNC: 3 MMOL/L (ref 3.5–5.1)
Q-T INTERVAL: 428 MS
Q-T INTERVAL: 434 MS
QRS DURATION: 86 MS
QRS DURATION: 88 MS
QTC CALCULATION (BEZET): 405 MS
QTC CALCULATION (BEZET): 440 MS
R AXIS: 24 DEGREES
R AXIS: 25 DEGREES
RBC # BLD AUTO: 5.66 X10(6)UL (ref 4.3–5.7)
SODIUM SERPL-SCNC: 138 MMOL/L (ref 136–145)
T AXIS: 13 DEGREES
T AXIS: 17 DEGREES
TROPONIN I SERPL-MCNC: <0.045 NG/ML (ref ?–0.04)
TROPONIN I SERPL-MCNC: <0.045 NG/ML (ref ?–0.04)
VENTRICULAR RATE: 54 BPM
VENTRICULAR RATE: 62 BPM
WBC # BLD AUTO: 4.7 X10(3) UL (ref 4–11)

## 2020-01-06 PROCEDURE — 70450 CT HEAD/BRAIN W/O DYE: CPT | Performed by: EMERGENCY MEDICINE

## 2020-01-06 PROCEDURE — 80053 COMPREHEN METABOLIC PANEL: CPT | Performed by: EMERGENCY MEDICINE

## 2020-01-06 PROCEDURE — 93017 CV STRESS TEST TRACING ONLY: CPT | Performed by: EMERGENCY MEDICINE

## 2020-01-06 PROCEDURE — 36415 COLL VENOUS BLD VENIPUNCTURE: CPT

## 2020-01-06 PROCEDURE — 93010 ELECTROCARDIOGRAM REPORT: CPT

## 2020-01-06 PROCEDURE — 99285 EMERGENCY DEPT VISIT HI MDM: CPT

## 2020-01-06 PROCEDURE — 93350 STRESS TTE ONLY: CPT | Performed by: EMERGENCY MEDICINE

## 2020-01-06 PROCEDURE — 93018 CV STRESS TEST I&R ONLY: CPT | Performed by: EMERGENCY MEDICINE

## 2020-01-06 PROCEDURE — 74175 CTA ABDOMEN W/CONTRAST: CPT | Performed by: EMERGENCY MEDICINE

## 2020-01-06 PROCEDURE — 84484 ASSAY OF TROPONIN QUANT: CPT | Performed by: EMERGENCY MEDICINE

## 2020-01-06 PROCEDURE — 71275 CT ANGIOGRAPHY CHEST: CPT | Performed by: EMERGENCY MEDICINE

## 2020-01-06 PROCEDURE — 85025 COMPLETE CBC W/AUTO DIFF WBC: CPT | Performed by: EMERGENCY MEDICINE

## 2020-01-06 PROCEDURE — 93005 ELECTROCARDIOGRAM TRACING: CPT

## 2020-01-06 RX ORDER — POTASSIUM CHLORIDE 20 MEQ/1
40 TABLET, EXTENDED RELEASE ORAL ONCE
Status: COMPLETED | OUTPATIENT
Start: 2020-01-06 | End: 2020-01-06

## 2020-01-06 RX ORDER — PANTOPRAZOLE SODIUM 40 MG/1
40 TABLET, DELAYED RELEASE ORAL DAILY
Qty: 30 TABLET | Refills: 0 | Status: SHIPPED | OUTPATIENT
Start: 2020-01-06 | End: 2020-02-05

## 2020-01-06 RX ORDER — CYCLOBENZAPRINE HCL 10 MG
10 TABLET ORAL 3 TIMES DAILY PRN
Qty: 20 TABLET | Refills: 0 | Status: SHIPPED | OUTPATIENT
Start: 2020-01-06 | End: 2020-01-13

## 2020-01-06 NOTE — ED NOTES
The patient stress test was read by Dr. Leeann Her is negative. I reexamined him he is neurologically intact.   He feels comfortable going home I discussed him it would be reasonable still to take 1 baby aspirin, but will shortly write a short course of muscl

## 2020-01-06 NOTE — ED INITIAL ASSESSMENT (HPI)
Pt presents to ed with chest pain with radiation to left arm, pt denies any sob and was seen last night for chest pain but states this feels different,

## 2020-01-06 NOTE — ED PROVIDER NOTES
Patient Seen in: [facility] Emergency Department      History   Patient presents with:  Chest Pain Angina    Stated Complaint: chest pain     HPI    Patient is a 28-year-old male with history of hypertension and high cholesterol who presents for roxana and reactive to light. Neck:      Musculoskeletal: Normal range of motion and neck supple. Cardiovascular:      Rate and Rhythm: Normal rate and regular rhythm. Heart sounds: Normal heart sounds.       Comments: No reproducible chest wall tendernes Criteria Score                 Lab Results   Component Value Date    TROP <0.045 01/06/2020         HEART Score: 0          Risk of adverse cardiac event is 0.9-1.7%                  MDM     78-year-old male with history of hypertension high cholesterol repeat negative troponin we will still probably not to be at the point where we can say with 392% certainty that this is not his heart.   He understands, he agrees with the plan for repeat troponin and will likely plan to go home after that if he is still f

## 2020-01-06 NOTE — ED INITIAL ASSESSMENT (HPI)
Patient to ER with complaint of R sided chest pain with tingling to L arm with mild headache. Patient states he had a similar episode of the chest pain this morning with HTN at home. Denies any SEDRICK. Breathing is unlabored, even and regular.

## 2020-01-06 NOTE — ED PROVIDER NOTES
Patient Seen in: [facility] Emergency Department      History   Patient presents with:  Chest Pain Angina    Stated Complaint: chest pain    HPI    This is a 13-year-old male who arrives here with complaints of chest pain the patient describes in hi I cannot reproduce any specific tenderness in that area. The patient is in no respiratory distress. The patient is not septic or toxic    HEENT: Atraumatic, conjunctiva are not pale. There is no icterus. Oral mucosa Is wet. No facial trauma.   The neck intervals I agree with the EKG report . The rate is 54     Ct Brain Or Head (78983)    Result Date: 1/6/2020  PROCEDURE:  CT BRAIN OR HEAD (32202)  COMPARISON:  None.   INDICATIONS:  chest pain  TECHNIQUE:  Noncontrast CT scanning is performed through the b Cta Chest + Cta Abdomen Dissect Set(cpt=71275/82221)    Result Date: 1/6/2020  PROCEDURE:  CTA CHEST + CTA ABDOMEN DISSECT SET(CPT=71275/12149)  COMPARISON:  [facility] , XR, XR CHEST AP PORTABLE  (CPT=71045), 5/23/2019, 13:55.   INDICATIONS:  chest pain  T potassium. The patient is presently having no significant pain some of the pain may be musculoskeletal is reproducible in the left posterior thoracic area. Some of it may be reflux.   I discussed with him that it may be still reasonable to get a stress te

## 2021-04-10 DIAGNOSIS — Z23 NEED FOR VACCINATION: ICD-10-CM

## 2021-12-11 ENCOUNTER — HOSPITAL ENCOUNTER (OUTPATIENT)
Facility: HOSPITAL | Age: 51
Setting detail: OBSERVATION
Discharge: HOME | End: 2021-12-12
Attending: EMERGENCY MEDICINE | Admitting: INTERNAL MEDICINE
Payer: COMMERCIAL

## 2021-12-11 ENCOUNTER — APPOINTMENT (OUTPATIENT)
Dept: GENERAL RADIOLOGY | Facility: HOSPITAL | Age: 51
End: 2021-12-11
Attending: EMERGENCY MEDICINE
Payer: COMMERCIAL

## 2021-12-11 DIAGNOSIS — R07.9 CHEST PAIN, RULE OUT ACUTE MYOCARDIAL INFARCTION: Primary | ICD-10-CM

## 2021-12-11 DIAGNOSIS — E87.1 HYPONATREMIA: ICD-10-CM

## 2021-12-11 DIAGNOSIS — R20.2 PARESTHESIAS: ICD-10-CM

## 2021-12-11 DIAGNOSIS — E87.6 HYPOKALEMIA: ICD-10-CM

## 2021-12-11 PROCEDURE — 71045 X-RAY EXAM CHEST 1 VIEW: CPT | Performed by: EMERGENCY MEDICINE

## 2021-12-11 RX ORDER — ASPIRIN 81 MG/1
324 TABLET, CHEWABLE ORAL ONCE
Status: COMPLETED | OUTPATIENT
Start: 2021-12-11 | End: 2021-12-11

## 2021-12-11 RX ORDER — POTASSIUM CHLORIDE 20 MEQ/1
40 TABLET, EXTENDED RELEASE ORAL ONCE
Status: COMPLETED | OUTPATIENT
Start: 2021-12-11 | End: 2021-12-11

## 2021-12-12 ENCOUNTER — APPOINTMENT (OUTPATIENT)
Dept: CV DIAGNOSTICS | Facility: HOSPITAL | Age: 51
End: 2021-12-12
Attending: INTERNAL MEDICINE
Payer: COMMERCIAL

## 2021-12-12 ENCOUNTER — APPOINTMENT (OUTPATIENT)
Dept: CT IMAGING | Facility: HOSPITAL | Age: 51
End: 2021-12-12
Attending: EMERGENCY MEDICINE
Payer: COMMERCIAL

## 2021-12-12 VITALS
RESPIRATION RATE: 16 BRPM | WEIGHT: 261.25 LBS | SYSTOLIC BLOOD PRESSURE: 102 MMHG | BODY MASS INDEX: 39 KG/M2 | OXYGEN SATURATION: 100 % | HEART RATE: 75 BPM | DIASTOLIC BLOOD PRESSURE: 67 MMHG | TEMPERATURE: 98 F

## 2021-12-12 PROBLEM — E87.1 HYPONATREMIA: Status: ACTIVE | Noted: 2021-12-12

## 2021-12-12 PROBLEM — R20.2 PARESTHESIAS: Status: ACTIVE | Noted: 2021-12-12

## 2021-12-12 PROBLEM — E87.6 HYPOKALEMIA: Status: ACTIVE | Noted: 2021-12-12

## 2021-12-12 PROCEDURE — 93306 TTE W/DOPPLER COMPLETE: CPT | Performed by: INTERNAL MEDICINE

## 2021-12-12 PROCEDURE — 99235 HOSP IP/OBS SAME DATE MOD 70: CPT | Performed by: INTERNAL MEDICINE

## 2021-12-12 PROCEDURE — 70450 CT HEAD/BRAIN W/O DYE: CPT | Performed by: EMERGENCY MEDICINE

## 2021-12-12 RX ORDER — BISACODYL 10 MG
10 SUPPOSITORY, RECTAL RECTAL
Status: DISCONTINUED | OUTPATIENT
Start: 2021-12-12 | End: 2021-12-12

## 2021-12-12 RX ORDER — LOSARTAN POTASSIUM 100 MG/1
100 TABLET ORAL DAILY
Status: DISCONTINUED | OUTPATIENT
Start: 2021-12-12 | End: 2021-12-12

## 2021-12-12 RX ORDER — MELATONIN
3 NIGHTLY PRN
Status: DISCONTINUED | OUTPATIENT
Start: 2021-12-12 | End: 2021-12-12

## 2021-12-12 RX ORDER — NEBIVOLOL 5 MG/1
5 TABLET ORAL DAILY
Status: DISCONTINUED | OUTPATIENT
Start: 2021-12-12 | End: 2021-12-12

## 2021-12-12 RX ORDER — ACETAMINOPHEN 325 MG/1
650 TABLET ORAL EVERY 6 HOURS PRN
Status: DISCONTINUED | OUTPATIENT
Start: 2021-12-12 | End: 2021-12-12

## 2021-12-12 RX ORDER — POTASSIUM CHLORIDE 20 MEQ/1
20 TABLET, EXTENDED RELEASE ORAL DAILY
Status: DISCONTINUED | OUTPATIENT
Start: 2021-12-13 | End: 2021-12-12

## 2021-12-12 RX ORDER — POTASSIUM CHLORIDE 20 MEQ/1
20 TABLET, EXTENDED RELEASE ORAL DAILY
Qty: 30 TABLET | Refills: 0 | Status: SHIPPED | OUTPATIENT
Start: 2021-12-13

## 2021-12-12 RX ORDER — ASPIRIN 81 MG/1
81 TABLET, CHEWABLE ORAL DAILY
COMMUNITY

## 2021-12-12 RX ORDER — PROCHLORPERAZINE EDISYLATE 5 MG/ML
5 INJECTION INTRAMUSCULAR; INTRAVENOUS EVERY 8 HOURS PRN
Status: DISCONTINUED | OUTPATIENT
Start: 2021-12-12 | End: 2021-12-12

## 2021-12-12 RX ORDER — ONDANSETRON 2 MG/ML
4 INJECTION INTRAMUSCULAR; INTRAVENOUS EVERY 6 HOURS PRN
Status: DISCONTINUED | OUTPATIENT
Start: 2021-12-12 | End: 2021-12-12

## 2021-12-12 RX ORDER — SODIUM PHOSPHATE, DIBASIC AND SODIUM PHOSPHATE, MONOBASIC 7; 19 G/133ML; G/133ML
1 ENEMA RECTAL ONCE AS NEEDED
Status: DISCONTINUED | OUTPATIENT
Start: 2021-12-12 | End: 2021-12-12

## 2021-12-12 RX ORDER — SENNOSIDES 8.6 MG
17.2 TABLET ORAL NIGHTLY PRN
Status: DISCONTINUED | OUTPATIENT
Start: 2021-12-12 | End: 2021-12-12

## 2021-12-12 RX ORDER — POTASSIUM CHLORIDE 20 MEQ/1
40 TABLET, EXTENDED RELEASE ORAL ONCE
Status: COMPLETED | OUTPATIENT
Start: 2021-12-12 | End: 2021-12-12

## 2021-12-12 RX ORDER — ASPIRIN 81 MG/1
81 TABLET, CHEWABLE ORAL DAILY
Status: DISCONTINUED | OUTPATIENT
Start: 2021-12-12 | End: 2021-12-12

## 2021-12-12 RX ORDER — POLYETHYLENE GLYCOL 3350 17 G/17G
17 POWDER, FOR SOLUTION ORAL DAILY PRN
Status: DISCONTINUED | OUTPATIENT
Start: 2021-12-12 | End: 2021-12-12

## 2021-12-12 RX ORDER — ENOXAPARIN SODIUM 100 MG/ML
40 INJECTION SUBCUTANEOUS DAILY
Status: DISCONTINUED | OUTPATIENT
Start: 2021-12-12 | End: 2021-12-12

## 2021-12-12 RX ORDER — ASPIRIN 325 MG
325 TABLET ORAL DAILY
Status: DISCONTINUED | OUTPATIENT
Start: 2021-12-12 | End: 2021-12-12

## 2021-12-12 RX ORDER — CARVEDILOL 12.5 MG/1
12.5 TABLET ORAL 2 TIMES DAILY WITH MEALS
Status: DISCONTINUED | OUTPATIENT
Start: 2021-12-12 | End: 2021-12-12

## 2021-12-12 RX ORDER — AMLODIPINE BESYLATE 5 MG/1
5 TABLET ORAL DAILY
Status: DISCONTINUED | OUTPATIENT
Start: 2021-12-12 | End: 2021-12-12

## 2021-12-12 NOTE — ED QUICK NOTES
Orders for admission, patient is aware of plan and ready to go upstairs. Any questions, please call ED RN [phone].      Patient Covid vaccination status: vaccinated     COVID Test Ordered in ED: Rapid SARS-CoV-2 by PCR    COVID Suspicion at Admission: Low cli

## 2021-12-12 NOTE — CONSULTS
Keo/Razia Report of Consultation      Angela Tafoya Patient Status:  Observation    1970 MRN [id number]   [facility] 2NE-A Attending Rajesh Leong MD   Hosp Day # 0 PCP Vianney Wisdom SR     Reason for Consultation     Ch (FLEET) 7-19 GM/118ML enema 133 mL, 1 enema, Rectal, Once PRN  •  ondansetron (ZOFRAN) injection 4 mg, 4 mg, Intravenous, Q6H PRN  •  prochlorperazine (COMPAZINE) injection 5 mg, 5 mg, Intravenous, Q8H PRN  •  amLODIPine (NORVASC) tab 5 mg, 5 mg, Oral, Virl Dom Radhames Medley MD  12/12/2021  1:51 PM

## 2021-12-12 NOTE — H&P
[facility]  History and Physical     [facility]:  [facility]    1970 MRN [id number]   Location [address] [facility] Attending Lindsay Romano MD   Hosp Day # 0 PCP Jozef Rendon SR     Chief Complai twice a week., Disp: , Rfl:   Cetirizine-Pseudoephedrine ER (ZYRTEC-D ALLERGY & CONGESTION) 5-120 MG Oral Tablet 12 Hr, Take 1 tablet by mouth 2 (two) times daily as needed for Allergies. , Disp: , Rfl:         Review of Systems:   A comprehensive 14 point CRP, WILLIAMS, LDH, DDIMER in the last 168 hours. Imaging: Imaging data reviewed in Epic.       ASSESSMENT / PLAN:     #Chest pain  - no acute ischemic changes on EKG  - trend troponin  - telemetry  - echo ordered  - cardiology     #Hypokalemia - replace  - h

## 2021-12-12 NOTE — ED QUICK NOTES
C/p over the last week, worsening into today radiating at times into neck and shoulders.   Pain reported bilaterally on chest and intermittent at rest   Blurred vision began today at 2030

## 2021-12-12 NOTE — ED PROVIDER NOTES
Patient Seen in: [facility] Emergency Department      History   Patient presents with:  Chest Pain    Stated Complaint:     Subjective:   HPI    Is a pleasant 16-year-old male coming with two complaints first complaint is intermittently for the past sternocleidomastoid and no nuchal rigidity  Lungs are clear to auscultation bilaterally with no wheezes retractions or rhonchi noted  Cardiovascular exam shows a regular rate and rhythm  Abdomen soft nontender with no rebound tenderness noted  Extremity ex encounter diagnosis)  Paresthesias  Hyponatremia  Hypokalemia     Disposition:  Admit  12/12/2021  1:31 am    Follow-up:  No follow-up provider specified.         Medications Prescribed:  Current Discharge Medication List                          [facility]

## 2021-12-12 NOTE — PLAN OF CARE
Assumed care of the patient at 07:00 AM.    N: AOx4  HEENT: Blurred vision in the right eye has resolved. R: CTA bilaterally. Room air. C: NSR on tele. Denies current chest pain. No edema.   GI: Patient reports his last bowel movement was 12/11/21  : WD

## 2021-12-13 NOTE — DISCHARGE SUMMARY
DOA: 12/11/21  DOD: 12/12/21    Pt seen and examined. Chest pain likely MSK.  Ekg/trops negative  Echo done, results to be followed by cards. Cards to eval as outpt and determine if further testing is needed. Suspect sx's related to hypokalemia.   Start

## 2021-12-13 NOTE — PROGRESS NOTES
Reviewed the AVS with the patient and his wife. All questions answered. Patient and his wife verbalized understanding on making a follow-up appointment with [facility]. Discharged home with no needs.

## 2023-01-21 ENCOUNTER — HOSPITAL ENCOUNTER (EMERGENCY)
Facility: HOSPITAL | Age: 53
Discharge: HOME | End: 2023-01-21
Attending: EMERGENCY MEDICINE
Payer: COMMERCIAL

## 2023-01-21 ENCOUNTER — APPOINTMENT (OUTPATIENT)
Dept: CT IMAGING | Facility: HOSPITAL | Age: 53
End: 2023-01-21
Attending: EMERGENCY MEDICINE
Payer: COMMERCIAL

## 2023-01-21 VITALS
WEIGHT: 250 LBS | BODY MASS INDEX: 37.03 KG/M2 | HEIGHT: 69 IN | TEMPERATURE: 97 F | RESPIRATION RATE: 16 BRPM | SYSTOLIC BLOOD PRESSURE: 170 MMHG | HEART RATE: 75 BPM | OXYGEN SATURATION: 100 % | DIASTOLIC BLOOD PRESSURE: 98 MMHG

## 2023-01-21 DIAGNOSIS — N20.0 RIGHT KIDNEY STONE: Primary | ICD-10-CM

## 2023-01-21 LAB
ALBUMIN SERPL-MCNC: 3.6 G/DL (ref 3.4–5)
ALBUMIN/GLOB SERPL: 0.8 {RATIO} (ref 1–2)
ALP LIVER SERPL-CCNC: 94 U/L
ALT SERPL-CCNC: 12 U/L
ANION GAP SERPL CALC-SCNC: 5 MMOL/L (ref 0–18)
AST SERPL-CCNC: 20 U/L (ref 15–37)
BASOPHILS # BLD AUTO: 0.02 X10(3) UL (ref 0–0.2)
BASOPHILS NFR BLD AUTO: 0.3 %
BILIRUB SERPL-MCNC: 0.4 MG/DL (ref 0.1–2)
BILIRUB UR QL STRIP.AUTO: NEGATIVE
BUN BLD-MCNC: 10 MG/DL (ref 7–18)
CALCIUM BLD-MCNC: 9.2 MG/DL (ref 8.5–10.1)
CHLORIDE SERPL-SCNC: 102 MMOL/L (ref 98–112)
CO2 SERPL-SCNC: 32 MMOL/L (ref 21–32)
COLOR UR AUTO: YELLOW
CREAT BLD-MCNC: 1.22 MG/DL
EOSINOPHIL # BLD AUTO: 0.08 X10(3) UL (ref 0–0.7)
EOSINOPHIL NFR BLD AUTO: 1.1 %
ERYTHROCYTE [DISTWIDTH] IN BLOOD BY AUTOMATED COUNT: 13.2 %
GFR SERPLBLD BASED ON 1.73 SQ M-ARVRAT: 72 ML/MIN/1.73M2 (ref 60–?)
GLOBULIN PLAS-MCNC: 4.4 G/DL (ref 2.8–4.4)
GLUCOSE BLD-MCNC: 112 MG/DL (ref 70–99)
GLUCOSE UR STRIP.AUTO-MCNC: NEGATIVE MG/DL
HCT VFR BLD AUTO: 48.8 %
HGB BLD-MCNC: 16.4 G/DL
IMM GRANULOCYTES # BLD AUTO: 0.01 X10(3) UL (ref 0–1)
IMM GRANULOCYTES NFR BLD: 0.1 %
KETONES UR STRIP.AUTO-MCNC: NEGATIVE MG/DL
LEUKOCYTE ESTERASE UR QL STRIP.AUTO: NEGATIVE
LIPASE SERPL-CCNC: 419 U/L (ref 73–393)
LYMPHOCYTES # BLD AUTO: 1.36 X10(3) UL (ref 1–4)
LYMPHOCYTES NFR BLD AUTO: 19.5 %
MCH RBC QN AUTO: 27.7 PG (ref 26–34)
MCHC RBC AUTO-ENTMCNC: 33.6 G/DL (ref 31–37)
MCV RBC AUTO: 82.6 FL
MONOCYTES # BLD AUTO: 0.35 X10(3) UL (ref 0.1–1)
MONOCYTES NFR BLD AUTO: 5 %
NEUTROPHILS # BLD AUTO: 5.17 X10 (3) UL (ref 1.5–7.7)
NEUTROPHILS # BLD AUTO: 5.17 X10(3) UL (ref 1.5–7.7)
NEUTROPHILS NFR BLD AUTO: 74 %
NITRITE UR QL STRIP.AUTO: NEGATIVE
OSMOLALITY SERPL CALC.SUM OF ELEC: 288 MOSM/KG (ref 275–295)
PH UR STRIP.AUTO: 8 [PH] (ref 5–8)
PLATELET # BLD AUTO: 283 10(3)UL (ref 150–450)
POTASSIUM SERPL-SCNC: 3.3 MMOL/L (ref 3.5–5.1)
PROT SERPL-MCNC: 8 G/DL (ref 6.4–8.2)
PROT UR STRIP.AUTO-MCNC: NEGATIVE MG/DL
RBC # BLD AUTO: 5.91 X10(6)UL
RBC #/AREA URNS AUTO: >10 /HPF
SODIUM SERPL-SCNC: 139 MMOL/L (ref 136–145)
SP GR UR STRIP.AUTO: 1.01 (ref 1–1.03)
UROBILINOGEN UR STRIP.AUTO-MCNC: <2 MG/DL
WBC # BLD AUTO: 7 X10(3) UL (ref 4–11)

## 2023-01-21 PROCEDURE — 85025 COMPLETE CBC W/AUTO DIFF WBC: CPT

## 2023-01-21 PROCEDURE — 83690 ASSAY OF LIPASE: CPT | Performed by: EMERGENCY MEDICINE

## 2023-01-21 PROCEDURE — 80053 COMPREHEN METABOLIC PANEL: CPT

## 2023-01-21 PROCEDURE — 96361 HYDRATE IV INFUSION ADD-ON: CPT

## 2023-01-21 PROCEDURE — 99285 EMERGENCY DEPT VISIT HI MDM: CPT

## 2023-01-21 PROCEDURE — 74177 CT ABD & PELVIS W/CONTRAST: CPT | Performed by: EMERGENCY MEDICINE

## 2023-01-21 PROCEDURE — 85025 COMPLETE CBC W/AUTO DIFF WBC: CPT | Performed by: EMERGENCY MEDICINE

## 2023-01-21 PROCEDURE — 81001 URINALYSIS AUTO W/SCOPE: CPT | Performed by: EMERGENCY MEDICINE

## 2023-01-21 PROCEDURE — 80053 COMPREHEN METABOLIC PANEL: CPT | Performed by: EMERGENCY MEDICINE

## 2023-01-21 PROCEDURE — 81001 URINALYSIS AUTO W/SCOPE: CPT

## 2023-01-21 PROCEDURE — 96374 THER/PROPH/DIAG INJ IV PUSH: CPT

## 2023-01-21 PROCEDURE — 99284 EMERGENCY DEPT VISIT MOD MDM: CPT

## 2023-01-21 RX ORDER — HYDROCODONE BITARTRATE AND ACETAMINOPHEN 5; 325 MG/1; MG/1
1-2 TABLET ORAL EVERY 6 HOURS PRN
Qty: 20 TABLET | Refills: 0 | Status: SHIPPED | OUTPATIENT
Start: 2023-01-21

## 2023-01-21 RX ORDER — KETOROLAC TROMETHAMINE 15 MG/ML
15 INJECTION, SOLUTION INTRAMUSCULAR; INTRAVENOUS ONCE
Status: COMPLETED | OUTPATIENT
Start: 2023-01-21 | End: 2023-01-21

## 2023-01-21 RX ORDER — RIFAMPIN 300 MG/1
150 CAPSULE ORAL 2 TIMES DAILY
COMMUNITY

## 2023-01-21 RX ORDER — ONDANSETRON 4 MG/1
4 TABLET, ORALLY DISINTEGRATING ORAL EVERY 4 HOURS PRN
Qty: 10 TABLET | Refills: 0 | Status: SHIPPED | OUTPATIENT
Start: 2023-01-21 | End: 2023-01-28

## 2023-01-21 RX ORDER — TAMSULOSIN HYDROCHLORIDE 0.4 MG/1
0.4 CAPSULE ORAL DAILY
Qty: 7 CAPSULE | Refills: 0 | Status: SHIPPED | OUTPATIENT
Start: 2023-01-21 | End: 2023-01-28

## 2023-01-21 RX ORDER — SODIUM CHLORIDE 9 MG/ML
INJECTION, SOLUTION INTRAVENOUS CONTINUOUS
Status: DISCONTINUED | OUTPATIENT
Start: 2023-01-21 | End: 2023-01-21

## 2023-01-21 RX ORDER — CEPHALEXIN 500 MG/1
500 CAPSULE ORAL 2 TIMES DAILY
COMMUNITY

## 2023-01-21 RX ORDER — IOHEXOL 350 MG/ML
100 INJECTION, SOLUTION INTRAVENOUS
Status: COMPLETED | OUTPATIENT
Start: 2023-01-21 | End: 2023-01-21

## (undated) NOTE — ED AVS SNAPSHOT
Juliana Green   MRN: [id number]    Department:  [facility] Emergency Department   Date of Visit:  5/23/2019           Disclosure     Insurance plans vary and the physician(s) referred by the ER may not be covered by your plan.  Please contact your tell this physician (or your personal doctor if your instructions are to return to your personal doctor) about any new or lasting problems. The primary care or specialist physician will see patients referred from the [facility] Emergency Department.  Bruce Castaneda

## (undated) NOTE — ED AVS SNAPSHOT
Karyle Aye   MRN: [id number]    Department:  [facility] Emergency Department   Date of Visit:  1/6/2020           Disclosure     Insurance plans vary and the physician(s) referred by the ER may not be covered by your plan.  Please contact your tell this physician (or your personal doctor if your instructions are to return to your personal doctor) about any new or lasting problems. The primary care or specialist physician will see patients referred from the [facility] Emergency Department.  Wing Doty

## (undated) NOTE — ED AVS SNAPSHOT
[facility]   MRN: [id number]    Department:  [facility] Emergency Department   Date of Visit:  4/30/2019           Disclosure     Insurance plans vary and the physician(s) referred by the ER may not be covered by your plan.  Please contact your tell this physician (or your personal doctor if your instructions are to return to your personal doctor) about any new or lasting problems. The primary care or specialist physician will see patients referred from the [facility] Emergency Department.  Grant William

## (undated) NOTE — ED AVS SNAPSHOT
Karyle Aye   MRN: [id number]    Department:  [facility] Emergency Department   Date of Visit:  1/5/2020           Disclosure     Insurance plans vary and the physician(s) referred by the ER may not be covered by your plan.  Please contact your tell this physician (or your personal doctor if your instructions are to return to your personal doctor) about any new or lasting problems. The primary care or specialist physician will see patients referred from the [facility] Emergency Department.  Cheryle Levins